# Patient Record
Sex: MALE | Race: WHITE | NOT HISPANIC OR LATINO | Employment: FULL TIME | URBAN - METROPOLITAN AREA
[De-identification: names, ages, dates, MRNs, and addresses within clinical notes are randomized per-mention and may not be internally consistent; named-entity substitution may affect disease eponyms.]

---

## 2023-05-13 DIAGNOSIS — I10 PRIMARY HYPERTENSION: Primary | ICD-10-CM

## 2023-05-15 RX ORDER — AMLODIPINE AND BENAZEPRIL HYDROCHLORIDE 5; 10 MG/1; MG/1
CAPSULE ORAL
Qty: 90 CAPSULE | Refills: 3 | Status: SHIPPED | OUTPATIENT
Start: 2023-05-15 | End: 2023-08-08 | Stop reason: ALTCHOICE

## 2023-05-15 RX ORDER — AMLODIPINE AND BENAZEPRIL HYDROCHLORIDE 5; 10 MG/1; MG/1
1 CAPSULE ORAL DAILY
COMMUNITY
Start: 2020-11-02 | End: 2023-08-21 | Stop reason: SDUPTHER

## 2023-08-08 ENCOUNTER — OFFICE VISIT (OUTPATIENT)
Dept: PRIMARY CARE | Facility: CLINIC | Age: 63
End: 2023-08-08
Payer: OTHER GOVERNMENT

## 2023-08-08 VITALS
DIASTOLIC BLOOD PRESSURE: 82 MMHG | WEIGHT: 226 LBS | HEART RATE: 83 BPM | OXYGEN SATURATION: 97 % | SYSTOLIC BLOOD PRESSURE: 134 MMHG | BODY MASS INDEX: 32.35 KG/M2 | TEMPERATURE: 97.2 F | HEIGHT: 70 IN

## 2023-08-08 DIAGNOSIS — R23.3 BRUISING, SPONTANEOUS: Primary | ICD-10-CM

## 2023-08-08 DIAGNOSIS — I10 PRIMARY HYPERTENSION: ICD-10-CM

## 2023-08-08 DIAGNOSIS — K76.0 FATTY LIVER: ICD-10-CM

## 2023-08-08 DIAGNOSIS — R31.1 BENIGN ESSENTIAL MICROSCOPIC HEMATURIA: ICD-10-CM

## 2023-08-08 DIAGNOSIS — E66.09 CLASS 1 OBESITY DUE TO EXCESS CALORIES WITH SERIOUS COMORBIDITY AND BODY MASS INDEX (BMI) OF 32.0 TO 32.9 IN ADULT: ICD-10-CM

## 2023-08-08 PROBLEM — R16.0 MASS OF MULTIPLE SITES OF LIVER: Status: ACTIVE | Noted: 2023-08-08

## 2023-08-08 PROBLEM — K22.10 EROSIVE ESOPHAGITIS: Status: ACTIVE | Noted: 2023-08-08

## 2023-08-08 PROBLEM — R93.5 ABNORMAL CT OF THE ABDOMEN: Status: ACTIVE | Noted: 2023-08-08

## 2023-08-08 PROBLEM — M89.8X9 LYTIC LESION OF BONE ON X-RAY: Status: ACTIVE | Noted: 2023-08-08

## 2023-08-08 PROBLEM — L57.0 ACTINIC KERATOSES: Status: ACTIVE | Noted: 2023-08-08

## 2023-08-08 PROBLEM — M89.9 LYTIC LESION OF BONE ON X-RAY: Status: ACTIVE | Noted: 2023-08-08

## 2023-08-08 PROBLEM — E66.9 OBESITY: Status: ACTIVE | Noted: 2023-08-08

## 2023-08-08 PROBLEM — R31.9 HEMATURIA: Status: ACTIVE | Noted: 2023-08-08

## 2023-08-08 PROCEDURE — 99214 OFFICE O/P EST MOD 30 MIN: CPT | Performed by: FAMILY MEDICINE

## 2023-08-08 PROCEDURE — 3079F DIAST BP 80-89 MM HG: CPT | Performed by: FAMILY MEDICINE

## 2023-08-08 PROCEDURE — 3075F SYST BP GE 130 - 139MM HG: CPT | Performed by: FAMILY MEDICINE

## 2023-08-08 PROCEDURE — 3008F BODY MASS INDEX DOCD: CPT | Performed by: FAMILY MEDICINE

## 2023-08-08 NOTE — PATIENT INSTRUCTIONS
It was nice to see you today!  Discussed current concerns and addressed   Reviewed recent labs and diagnostics  Reviewed medications list  Continue to eat a healthy diet, exercise at least 3 times a week or more  Plan and follow up discussed  For any further information related to your condition, copy and paste or go to [email or web address]  Get labs, check PT/INR  Hematology consult

## 2023-08-09 ENCOUNTER — LAB (OUTPATIENT)
Dept: LAB | Facility: LAB | Age: 63
End: 2023-08-09
Payer: OTHER GOVERNMENT

## 2023-08-09 DIAGNOSIS — R31.1 BENIGN ESSENTIAL MICROSCOPIC HEMATURIA: ICD-10-CM

## 2023-08-09 DIAGNOSIS — R23.3 BRUISING, SPONTANEOUS: ICD-10-CM

## 2023-08-09 DIAGNOSIS — I10 PRIMARY HYPERTENSION: ICD-10-CM

## 2023-08-09 LAB
ACTIVATED PARTIAL THROMBOPLASTIN TIME IN PPP BY COAGULATION ASSAY: 27 SEC (ref 27–38)
ALANINE AMINOTRANSFERASE (SGPT) (U/L) IN SER/PLAS: 33 U/L (ref 10–52)
ALBUMIN (G/DL) IN SER/PLAS: 4.2 G/DL (ref 3.4–5)
ALKALINE PHOSPHATASE (U/L) IN SER/PLAS: 64 U/L (ref 33–136)
ANION GAP IN SER/PLAS: 12 MMOL/L (ref 10–20)
APPEARANCE, URINE: CLEAR
ASPARTATE AMINOTRANSFERASE (SGOT) (U/L) IN SER/PLAS: 20 U/L (ref 9–39)
BASOPHILS (10*3/UL) IN BLOOD BY AUTOMATED COUNT: 0.09 X10E9/L (ref 0–0.1)
BASOPHILS/100 LEUKOCYTES IN BLOOD BY AUTOMATED COUNT: 1.3 % (ref 0–2)
BILIRUBIN TOTAL (MG/DL) IN SER/PLAS: 0.5 MG/DL (ref 0–1.2)
BILIRUBIN, URINE: NEGATIVE
BLOOD, URINE: ABNORMAL
CALCIDIOL (25 OH VITAMIN D3) (NG/ML) IN SER/PLAS: 26 NG/ML
CALCIUM (MG/DL) IN SER/PLAS: 9.1 MG/DL (ref 8.6–10.6)
CARBON DIOXIDE, TOTAL (MMOL/L) IN SER/PLAS: 26 MMOL/L (ref 21–32)
CHLORIDE (MMOL/L) IN SER/PLAS: 107 MMOL/L (ref 98–107)
CHOLESTEROL (MG/DL) IN SER/PLAS: 186 MG/DL (ref 0–199)
CHOLESTEROL IN HDL (MG/DL) IN SER/PLAS: 74.8 MG/DL
CHOLESTEROL/HDL RATIO: 2.5
COLOR, URINE: YELLOW
CREATININE (MG/DL) IN SER/PLAS: 1.08 MG/DL (ref 0.5–1.3)
EOSINOPHILS (10*3/UL) IN BLOOD BY AUTOMATED COUNT: 0.33 X10E9/L (ref 0–0.7)
EOSINOPHILS/100 LEUKOCYTES IN BLOOD BY AUTOMATED COUNT: 4.9 % (ref 0–6)
ERYTHROCYTE DISTRIBUTION WIDTH (RATIO) BY AUTOMATED COUNT: 12.3 % (ref 11.5–14.5)
ERYTHROCYTE MEAN CORPUSCULAR HEMOGLOBIN CONCENTRATION (G/DL) BY AUTOMATED: 34.2 G/DL (ref 32–36)
ERYTHROCYTE MEAN CORPUSCULAR VOLUME (FL) BY AUTOMATED COUNT: 94 FL (ref 80–100)
ERYTHROCYTES (10*6/UL) IN BLOOD BY AUTOMATED COUNT: 4.97 X10E12/L (ref 4.5–5.9)
GFR MALE: 77 ML/MIN/1.73M2
GLUCOSE (MG/DL) IN SER/PLAS: 100 MG/DL (ref 74–99)
GLUCOSE, URINE: NEGATIVE MG/DL
HEMATOCRIT (%) IN BLOOD BY AUTOMATED COUNT: 46.5 % (ref 41–52)
HEMOGLOBIN (G/DL) IN BLOOD: 15.9 G/DL (ref 13.5–17.5)
IMMATURE GRANULOCYTES/100 LEUKOCYTES IN BLOOD BY AUTOMATED COUNT: 0.4 % (ref 0–0.9)
INR IN PPP BY COAGULATION ASSAY: 0.9 (ref 0.9–1.1)
KETONES, URINE: NEGATIVE MG/DL
LDL: 103 MG/DL (ref 0–99)
LEUKOCYTE ESTERASE, URINE: NEGATIVE
LEUKOCYTES (10*3/UL) IN BLOOD BY AUTOMATED COUNT: 6.7 X10E9/L (ref 4.4–11.3)
LYMPHOCYTES (10*3/UL) IN BLOOD BY AUTOMATED COUNT: 1.41 X10E9/L (ref 1.2–4.8)
LYMPHOCYTES/100 LEUKOCYTES IN BLOOD BY AUTOMATED COUNT: 21.1 % (ref 13–44)
MONOCYTES (10*3/UL) IN BLOOD BY AUTOMATED COUNT: 0.76 X10E9/L (ref 0.1–1)
MONOCYTES/100 LEUKOCYTES IN BLOOD BY AUTOMATED COUNT: 11.4 % (ref 2–10)
NEUTROPHILS (10*3/UL) IN BLOOD BY AUTOMATED COUNT: 4.06 X10E9/L (ref 1.2–7.7)
NEUTROPHILS/100 LEUKOCYTES IN BLOOD BY AUTOMATED COUNT: 60.9 % (ref 40–80)
NITRITE, URINE: NEGATIVE
NRBC (PER 100 WBCS) BY AUTOMATED COUNT: 0 /100 WBC (ref 0–0)
PH, URINE: 5 (ref 5–8)
PLATELETS (10*3/UL) IN BLOOD AUTOMATED COUNT: 232 X10E9/L (ref 150–450)
POTASSIUM (MMOL/L) IN SER/PLAS: 4.1 MMOL/L (ref 3.5–5.3)
PROSTATE SPECIFIC ANTIGEN,SCREEN: 0.67 NG/ML (ref 0–4)
PROTEIN TOTAL: 6.6 G/DL (ref 6.4–8.2)
PROTEIN, URINE: NEGATIVE MG/DL
PROTHROMBIN TIME (PT) IN PPP BY COAGULATION ASSAY: 10.4 SEC (ref 9.8–12.8)
RBC, URINE: 1 /HPF (ref 0–5)
SODIUM (MMOL/L) IN SER/PLAS: 141 MMOL/L (ref 136–145)
SPECIFIC GRAVITY, URINE: 1.01 (ref 1–1.03)
THYROTROPIN (MIU/L) IN SER/PLAS BY DETECTION LIMIT <= 0.05 MIU/L: 2.93 MIU/L (ref 0.44–3.98)
TRIGLYCERIDE (MG/DL) IN SER/PLAS: 43 MG/DL (ref 0–149)
UREA NITROGEN (MG/DL) IN SER/PLAS: 17 MG/DL (ref 6–23)
UROBILINOGEN, URINE: <2 MG/DL (ref 0–1.9)
VLDL: 9 MG/DL (ref 0–40)
WBC, URINE: <1 /HPF (ref 0–5)

## 2023-08-09 PROCEDURE — 80053 COMPREHEN METABOLIC PANEL: CPT

## 2023-08-09 PROCEDURE — 85610 PROTHROMBIN TIME: CPT

## 2023-08-09 PROCEDURE — 84443 ASSAY THYROID STIM HORMONE: CPT

## 2023-08-09 PROCEDURE — 84153 ASSAY OF PSA TOTAL: CPT

## 2023-08-09 PROCEDURE — 85025 COMPLETE CBC W/AUTO DIFF WBC: CPT

## 2023-08-09 PROCEDURE — 80061 LIPID PANEL: CPT

## 2023-08-09 PROCEDURE — 82306 VITAMIN D 25 HYDROXY: CPT

## 2023-08-09 PROCEDURE — 36415 COLL VENOUS BLD VENIPUNCTURE: CPT

## 2023-08-09 PROCEDURE — 81001 URINALYSIS AUTO W/SCOPE: CPT

## 2023-08-09 PROCEDURE — 85730 THROMBOPLASTIN TIME PARTIAL: CPT

## 2023-08-09 NOTE — PROGRESS NOTES
Subjective   Patient ID: Dustin Rocha is a 62 y.o. male.    Patient comes in today for multiple issues.  He has a history of hypertension, obesity with a BMI of 32.43.  He has a fatty liver and esophagitis.  I am under the impression he may drink alcohol more than he should.  He has been having spontaneous bruising.  It is fairly significant from the pictures I am seeing.  He had labs over a year ago and liver functions were normal.  PSA was normal.  CBC and platelets were normal.  No history of thyroid disorder.  No trauma.  No chest pain or shortness of breath, no fever or chills but he has had malaise and just does not feel well in general.        Review of Systems   Constitutional:  Positive for fatigue. Negative for fever and unexpected weight change.   HENT:  Negative for congestion, ear pain, hearing loss, sore throat and trouble swallowing.    Eyes:  Negative for pain and visual disturbance.   Respiratory:  Negative for cough and shortness of breath.    Cardiovascular:  Negative for chest pain, palpitations and leg swelling.   Gastrointestinal:  Negative for abdominal pain, blood in stool, diarrhea, nausea and vomiting.   Genitourinary:  Negative for dysuria, frequency, hematuria and urgency.   Musculoskeletal:  Negative for joint swelling.   Skin:  Negative for pallor and rash.   Neurological:  Negative for dizziness, syncope, weakness, numbness and headaches.   Hematological:  Bruises/bleeds easily.   Psychiatric/Behavioral:  Negative for confusion, decreased concentration, hallucinations and suicidal ideas.      Vitals:    08/08/23 1102   BP: 134/82   Pulse: 83   Temp: 36.2 °C (97.2 °F)   SpO2: 97%      Objective   Physical Exam  Constitutional:       Appearance: Normal appearance. He is obese.   Cardiovascular:      Rate and Rhythm: Normal rate and regular rhythm.      Heart sounds: Normal heart sounds.   Pulmonary:      Effort: Pulmonary effort is normal.      Breath sounds: Normal breath sounds.    Musculoskeletal:      Cervical back: Neck supple.   Skin:     General: Skin is warm and dry.      Comments: There is diffuse ecchymoses and patches.  Reviewed picture which shows the condition earlier on.   Neurological:      General: No focal deficit present.      Mental Status: He is alert.   Psychiatric:         Mood and Affect: Mood normal.         Speech: Speech normal.         Behavior: Behavior normal.         Cognition and Memory: Cognition normal.         Assessment/Plan   Diagnoses and all orders for this visit:  Bruising, spontaneous  -     CBC and Auto Differential; Future  -     Comprehensive Metabolic Panel; Future  -     Lipid Panel; Future  -     Prostate Specific Antigen, Screen; Future  -     Thyroid Stimulating Hormone; Future  -     Urinalysis with Reflex Microscopic; Future  -     Vitamin D, Total; Future  -     APTT; Future  -     Protime-INR; Future  -     Referral to Hematology; Future  Benign essential microscopic hematuria  -     CBC and Auto Differential; Future  -     Comprehensive Metabolic Panel; Future  -     Lipid Panel; Future  -     Prostate Specific Antigen, Screen; Future  -     Thyroid Stimulating Hormone; Future  -     Urinalysis with Reflex Microscopic; Future  -     Vitamin D, Total; Future  -     APTT; Future  -     Protime-INR; Future  -     Referral to Hematology; Future  Primary hypertension  -     CBC and Auto Differential; Future  -     Comprehensive Metabolic Panel; Future  -     Lipid Panel; Future  -     Prostate Specific Antigen, Screen; Future  -     Thyroid Stimulating Hormone; Future  -     Urinalysis with Reflex Microscopic; Future  -     Vitamin D, Total; Future  -     APTT; Future  -     Protime-INR; Future  -     Referral to Hematology; Future        Attachments    [id number]) (1).JPG      Yes

## 2023-08-21 DIAGNOSIS — I10 PRIMARY HYPERTENSION: Primary | ICD-10-CM

## 2023-08-21 RX ORDER — AMLODIPINE AND BENAZEPRIL HYDROCHLORIDE 5; 10 MG/1; MG/1
1 CAPSULE ORAL DAILY
Qty: 90 CAPSULE | Refills: 3 | Status: SHIPPED | OUTPATIENT
Start: 2023-08-21 | End: 2024-08-20

## 2023-08-22 ASSESSMENT — ENCOUNTER SYMPTOMS
SHORTNESS OF BREATH: 0
PALPITATIONS: 0
BRUISES/BLEEDS EASILY: 1
WEAKNESS: 0
UNEXPECTED WEIGHT CHANGE: 0
TROUBLE SWALLOWING: 0
JOINT SWELLING: 0
BLOOD IN STOOL: 0
DIARRHEA: 0
FREQUENCY: 0
CONFUSION: 0
HEADACHES: 0
VOMITING: 0
ABDOMINAL PAIN: 0
DECREASED CONCENTRATION: 0
DIZZINESS: 0
SORE THROAT: 0
COUGH: 0
EYE PAIN: 0
HEMATURIA: 0
NUMBNESS: 0
HALLUCINATIONS: 0
DYSURIA: 0
FEVER: 0
NAUSEA: 0
FATIGUE: 1

## 2023-08-23 DIAGNOSIS — R35.1 NOCTURIA: Primary | ICD-10-CM

## 2023-08-23 RX ORDER — TADALAFIL 5 MG/1
5 TABLET ORAL DAILY
Qty: 10 TABLET | Refills: 0 | Status: SHIPPED | OUTPATIENT
Start: 2023-08-23 | End: 2023-09-22

## 2023-09-12 ENCOUNTER — TELEPHONE (OUTPATIENT)
Dept: PRIMARY CARE | Facility: CLINIC | Age: 63
End: 2023-09-12
Payer: OTHER GOVERNMENT

## 2023-12-20 ENCOUNTER — TELEMEDICINE (OUTPATIENT)
Dept: PRIMARY CARE | Facility: CLINIC | Age: 63
End: 2023-12-20
Payer: OTHER GOVERNMENT

## 2023-12-20 VITALS — WEIGHT: 220 LBS | BODY MASS INDEX: 31.57 KG/M2 | TEMPERATURE: 102.8 F

## 2023-12-20 DIAGNOSIS — B34.9 VIRAL SYNDROME: Primary | ICD-10-CM

## 2023-12-20 PROBLEM — R10.9 ABDOMINAL PAIN: Status: ACTIVE | Noted: 2023-12-20

## 2023-12-20 PROBLEM — K59.00 CONSTIPATION: Status: ACTIVE | Noted: 2023-12-20

## 2023-12-20 PROBLEM — R42 DIZZINESS: Status: ACTIVE | Noted: 2023-12-20

## 2023-12-20 PROBLEM — R23.3 EASY BRUISING: Status: ACTIVE | Noted: 2023-12-20

## 2023-12-20 PROBLEM — F10.10 ALCOHOL ABUSE: Status: ACTIVE | Noted: 2023-12-20

## 2023-12-20 PROBLEM — R42 VERTIGO: Status: ACTIVE | Noted: 2023-12-20

## 2023-12-20 PROBLEM — R07.81 RIB PAIN: Status: ACTIVE | Noted: 2023-12-20

## 2023-12-20 PROCEDURE — 99213 OFFICE O/P EST LOW 20 MIN: CPT | Performed by: FAMILY MEDICINE

## 2023-12-20 RX ORDER — ALBUTEROL SULFATE 90 UG/1
2 AEROSOL, METERED RESPIRATORY (INHALATION) EVERY 4 HOURS PRN
Qty: 8 G | Refills: 5 | Status: SHIPPED | OUTPATIENT
Start: 2023-12-20 | End: 2024-12-19

## 2023-12-20 RX ORDER — BENZONATATE 200 MG/1
200 CAPSULE ORAL 3 TIMES DAILY PRN
Qty: 42 CAPSULE | Refills: 0 | Status: SHIPPED | OUTPATIENT
Start: 2023-12-20 | End: 2024-01-19

## 2023-12-20 ASSESSMENT — ENCOUNTER SYMPTOMS
FEVER: 1
SORE THROAT: 1
COUGH: 1
CHILLS: 0
HEMOPTYSIS: 0
WEIGHT LOSS: 0
RHINORRHEA: 1
HEADACHES: 1
HEARTBURN: 0
WHEEZING: 1
SHORTNESS OF BREATH: 1
MYALGIAS: 0
SWEATS: 1

## 2023-12-20 ASSESSMENT — PAIN SCALES - GENERAL: PAINLEVEL: 7

## 2023-12-20 NOTE — PROGRESS NOTES
Answers submitted by the patient for this visit:  Cough Questionnaire (Submitted on 12/20/2023)  Chief Complaint: Cough  chest pain: No  chills: No  ear congestion: No  ear pain: No  fever: Yes  headaches: Yes  heartburn: No  hemoptysis: No  myalgias: No  nasal congestion: Yes  postnasal drip: Yes  rash: No  rhinorrhea: Yes  shortness of breath: Yes  sore throat: Yes  sweats: Yes  weight loss: No  wheezing: Yes  Aggravated by: nothing

## 2023-12-22 ENCOUNTER — TELEPHONE (OUTPATIENT)
Dept: PRIMARY CARE | Facility: CLINIC | Age: 63
End: 2023-12-22
Payer: OTHER GOVERNMENT

## 2023-12-22 DIAGNOSIS — J40 BRONCHITIS: Primary | ICD-10-CM

## 2023-12-22 RX ORDER — DOXYCYCLINE 100 MG/1
100 CAPSULE ORAL 2 TIMES DAILY
Qty: 20 CAPSULE | Refills: 0 | Status: SHIPPED | OUTPATIENT
Start: 2023-12-22 | End: 2024-01-01

## 2023-12-22 ASSESSMENT — ENCOUNTER SYMPTOMS
PALPITATIONS: 0
NUMBNESS: 0
FATIGUE: 0
HEMOPTYSIS: 0
MYALGIAS: 0
FREQUENCY: 0
WEAKNESS: 0
SORE THROAT: 1
ABDOMINAL PAIN: 0
VOMITING: 0
FEVER: 1
HEADACHES: 1
SHORTNESS OF BREATH: 1
HALLUCINATIONS: 0
DECREASED CONCENTRATION: 0
RHINORRHEA: 1
CHILLS: 0
DYSURIA: 0
TROUBLE SWALLOWING: 0
BLOOD IN STOOL: 0
JOINT SWELLING: 0
UNEXPECTED WEIGHT CHANGE: 0
SWEATS: 1
COUGH: 1
CONFUSION: 0
DIZZINESS: 0
WEIGHT LOSS: 0
EYE PAIN: 0
HEMATURIA: 0
DIARRHEA: 0
HEARTBURN: 0
WHEEZING: 1
NAUSEA: 0

## 2023-12-22 ASSESSMENT — COPD QUESTIONNAIRES: COPD: 0

## 2023-12-22 NOTE — TELEPHONE ENCOUNTER
Seen virtual Wednesday, given albuterol inhaler, tessalon pearls. Pt states symptoms are worsening, cough not better, now discolored mucous. Thinks he has bronchitis. Was advised if not feeling better to contact office

## 2023-12-22 NOTE — PATIENT INSTRUCTIONS
You have a bronchitis, it is most likely viral.  I will give you an inhaler and some cough medicine.  Antibiotics tend not to help at this point.  Drink plenty of fluids, rest, NSAIDs or Tylenol if helpful.  Mucinex can be helpful as well and over-the-counter cough suppressants.  Follow-up if symptoms worsen.

## 2023-12-22 NOTE — PROGRESS NOTES
Subjective   Patient ID: Dustin Rocha is a 63 y.o. male.    URI   This is a new problem. The current episode started in the past 7 days. The problem has been gradually worsening. Associated symptoms include coughing, headaches, rhinorrhea, a sore throat and wheezing. Pertinent negatives include no abdominal pain, chest pain, congestion, diarrhea, dysuria, ear pain, nausea, rash or vomiting. He has tried acetaminophen, NSAIDs, increased fluids and sleep for the symptoms. The treatment provided no relief.   Cough  This is a new problem. The current episode started in the past 7 days. The problem has been unchanged. The cough is Non-productive. Associated symptoms include a fever, headaches, nasal congestion, postnasal drip, rhinorrhea, a sore throat, shortness of breath, sweats and wheezing. Pertinent negatives include no chest pain, chills, ear congestion, ear pain, heartburn, hemoptysis, myalgias, rash or weight loss. Nothing aggravates the symptoms. There is no history of COPD or emphysema.       Review of Systems   Constitutional:  Positive for fever. Negative for chills, fatigue, unexpected weight change and weight loss.   HENT:  Positive for postnasal drip, rhinorrhea and sore throat. Negative for congestion, ear pain, hearing loss and trouble swallowing.    Eyes:  Negative for pain and visual disturbance.   Respiratory:  Positive for cough, shortness of breath and wheezing. Negative for hemoptysis.    Cardiovascular:  Negative for chest pain, palpitations and leg swelling.   Gastrointestinal:  Negative for abdominal pain, blood in stool, diarrhea, heartburn, nausea and vomiting.   Genitourinary:  Negative for dysuria, frequency, hematuria and urgency.   Musculoskeletal:  Negative for joint swelling and myalgias.   Skin:  Negative for pallor and rash.   Neurological:  Positive for headaches. Negative for dizziness, syncope, weakness and numbness.   Psychiatric/Behavioral:  Negative for confusion, decreased  concentration, hallucinations and suicidal ideas.      Vitals:    12/20/23 1300   Temp: 39.3 °C (102.8 °F)      Objective   Physical Exam  Constitutional:       General: He is not in acute distress.     Appearance: Normal appearance. He is not toxic-appearing.      Comments: Sounds congested.   Neurological:      Mental Status: He is alert.         Assessment/Plan   Diagnoses and all orders for this visit:  Viral syndrome  -     albuterol (Ventolin HFA) 90 mcg/actuation inhaler; Inhale 2 puffs every 4 hours if needed for wheezing or shortness of breath.  -     benzonatate (Tessalon) 200 mg capsule; Take 1 capsule (200 mg) by mouth 3 times a day as needed for cough. Do not crush or chew.

## 2024-05-29 ENCOUNTER — HOSPITAL ENCOUNTER (OUTPATIENT)
Dept: RADIOLOGY | Facility: CLINIC | Age: 64
Discharge: HOME | End: 2024-05-29
Payer: OTHER GOVERNMENT

## 2024-05-29 DIAGNOSIS — M25.571 PAIN IN RIGHT ANKLE AND JOINTS OF RIGHT FOOT: ICD-10-CM

## 2024-05-29 DIAGNOSIS — G89.29 OTHER CHRONIC PAIN: ICD-10-CM

## 2024-05-29 PROCEDURE — 73721 MRI JNT OF LWR EXTRE W/O DYE: CPT | Mod: RT

## 2024-05-29 PROCEDURE — 73721 MRI JNT OF LWR EXTRE W/O DYE: CPT | Mod: RIGHT SIDE | Performed by: RADIOLOGY

## 2024-12-31 DIAGNOSIS — I10 PRIMARY HYPERTENSION: ICD-10-CM

## 2024-12-31 RX ORDER — AMLODIPINE AND BENAZEPRIL HYDROCHLORIDE 5; 10 MG/1; MG/1
1 CAPSULE ORAL DAILY
Qty: 90 CAPSULE | Refills: 0 | Status: SHIPPED | OUTPATIENT
Start: 2024-12-31

## 2025-02-22 ENCOUNTER — APPOINTMENT (OUTPATIENT)
Dept: RADIOLOGY | Facility: HOSPITAL | Age: 65
End: 2025-02-22
Payer: OTHER GOVERNMENT

## 2025-02-22 ENCOUNTER — OFFICE VISIT (OUTPATIENT)
Dept: URGENT CARE | Age: 65
End: 2025-02-22
Payer: OTHER GOVERNMENT

## 2025-02-22 ENCOUNTER — HOSPITAL ENCOUNTER (EMERGENCY)
Facility: HOSPITAL | Age: 65
Discharge: HOME | End: 2025-02-22
Attending: EMERGENCY MEDICINE
Payer: OTHER GOVERNMENT

## 2025-02-22 VITALS
DIASTOLIC BLOOD PRESSURE: 83 MMHG | BODY MASS INDEX: 30.92 KG/M2 | HEART RATE: 77 BPM | HEIGHT: 70 IN | TEMPERATURE: 98.1 F | OXYGEN SATURATION: 99 % | RESPIRATION RATE: 16 BRPM | WEIGHT: 216 LBS | SYSTOLIC BLOOD PRESSURE: 146 MMHG

## 2025-02-22 VITALS
DIASTOLIC BLOOD PRESSURE: 85 MMHG | RESPIRATION RATE: 17 BRPM | SYSTOLIC BLOOD PRESSURE: 161 MMHG | BODY MASS INDEX: 30.92 KG/M2 | HEIGHT: 70 IN | WEIGHT: 216 LBS | OXYGEN SATURATION: 99 % | HEART RATE: 70 BPM | TEMPERATURE: 98.2 F

## 2025-02-22 DIAGNOSIS — S09.90XA INJURY OF HEAD, INITIAL ENCOUNTER: ICD-10-CM

## 2025-02-22 DIAGNOSIS — S20.219A CONTUSION OF RIB, UNSPECIFIED LATERALITY, INITIAL ENCOUNTER: Primary | ICD-10-CM

## 2025-02-22 DIAGNOSIS — S19.9XXA INJURY OF NECK, INITIAL ENCOUNTER: ICD-10-CM

## 2025-02-22 DIAGNOSIS — S09.90XA MINOR HEAD INJURY, INITIAL ENCOUNTER: ICD-10-CM
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
## 2025-02-22 PROCEDURE — 99284 EMERGENCY DEPT VISIT MOD MDM: CPT | Mod: 25 | Performed by: EMERGENCY MEDICINE

## 2025-02-22 PROCEDURE — 2500000001 HC RX 250 WO HCPCS SELF ADMINISTERED DRUGS (ALT 637 FOR MEDICARE OP): Performed by: EMERGENCY MEDICINE

## 2025-02-22 PROCEDURE — 70450 CT HEAD/BRAIN W/O DYE: CPT

## 2025-02-22 PROCEDURE — 70450 CT HEAD/BRAIN W/O DYE: CPT | Performed by: STUDENT IN AN ORGANIZED HEALTH CARE EDUCATION/TRAINING PROGRAM

## 2025-02-22 PROCEDURE — 72125 CT NECK SPINE W/O DYE: CPT

## 2025-02-22 PROCEDURE — 71250 CT THORAX DX C-: CPT

## 2025-02-22 PROCEDURE — 71250 CT THORAX DX C-: CPT | Performed by: STUDENT IN AN ORGANIZED HEALTH CARE EDUCATION/TRAINING PROGRAM

## 2025-02-22 PROCEDURE — 72125 CT NECK SPINE W/O DYE: CPT | Performed by: STUDENT IN AN ORGANIZED HEALTH CARE EDUCATION/TRAINING PROGRAM

## 2025-02-22 RX ORDER — OXYCODONE HYDROCHLORIDE 5 MG/1
5 TABLET ORAL ONCE
Status: COMPLETED | OUTPATIENT
Start: 2025-02-22 | End: 2025-02-22

## 2025-02-22 RX ORDER — NAPROXEN 250 MG/1
500 TABLET ORAL ONCE
Status: COMPLETED | OUTPATIENT
Start: 2025-02-22 | End: 2025-02-22

## 2025-02-22 RX ORDER — OXYCODONE HYDROCHLORIDE 5 MG/1
5 TABLET ORAL EVERY 8 HOURS PRN
Qty: 6 TABLET | Refills: 0 | Status: SHIPPED | OUTPATIENT
Start: 2025-02-22

## 2025-02-22 RX ADMIN — OXYCODONE HYDROCHLORIDE 5 MG: 5 TABLET ORAL at 09:28

## 2025-02-22 RX ADMIN — NAPROXEN 500 MG: 250 TABLET ORAL at 09:28

## 2025-02-22 ASSESSMENT — COLUMBIA-SUICIDE SEVERITY RATING SCALE - C-SSRS
2. HAVE YOU ACTUALLY HAD ANY THOUGHTS OF KILLING YOURSELF?: NO
6. HAVE YOU EVER DONE ANYTHING, STARTED TO DO ANYTHING, OR PREPARED TO DO ANYTHING TO END YOUR LIFE?: NO
1. IN THE PAST MONTH, HAVE YOU WISHED YOU WERE DEAD OR WISHED YOU COULD GO TO SLEEP AND NOT WAKE UP?: NO

## 2025-02-22 ASSESSMENT — PATIENT HEALTH QUESTIONNAIRE - PHQ9
SUM OF ALL RESPONSES TO PHQ9 QUESTIONS 1 & 2: 0
2. FEELING DOWN, DEPRESSED OR HOPELESS: NOT AT ALL
1. LITTLE INTEREST OR PLEASURE IN DOING THINGS: NOT AT ALL

## 2025-02-22 ASSESSMENT — PAIN SCALES - GENERAL: PAINLEVEL_OUTOF10: 7

## 2025-02-22 ASSESSMENT — PAIN - FUNCTIONAL ASSESSMENT: PAIN_FUNCTIONAL_ASSESSMENT: 0-10

## 2025-02-22 NOTE — PROGRESS NOTES
"Subjective   Patient ID: Dustin Rocha is a 64 y.o. male. They present today with a chief complaint of Fall Injury (PT states he fell on ice yesterday. Landed on upper back. States he hit his head, neck and back. C/O neck pain.).    History of Present Illness  64-year-old male presents urgent care for complaint of fall occurred yesterday on the ice states he slipped and fell backwards landed on his back.  States he hit his neck and he believes the back of his head at all.  Denies any loss consciousness, confusion, focal deficits, anticoagulant use, vision changes, dizziness or lightheadedness, abdominal pain, nausea or vomiting, shortness of breath.  States it hurts his lower ribs when he takes deep breaths and when walking.  Patient has midline tenderness to the C-spine.  No overlying skin changes, crepitus or obvious deformity to posterior head where patient claims he hit his head.  No neurological deficits.  Patient alert and oriented x 3.  Patient referred emergency department for further evaluation with CT scan imaging.  Patient is agreeable and states he is going directly to the emergency department.          Past Medical History  Allergies as of 02/22/2025 - Reviewed 02/22/2025   Allergen Reaction Noted    Darunavir Other 07/28/2017    Sulfa (sulfonamide antibiotics) Rash 08/08/2023       (Not in a hospital admission)       No past medical history on file.    No past surgical history on file.     reports that he has never smoked. His smokeless tobacco use includes chew.    Review of Systems  Review of Systems   All other systems reviewed and are negative.                                 Objective    Vitals:    02/22/25 0814   TempSrc: Oral   Weight: 98 kg (216 lb)   Height: 1.778 m (5' 10\")     No LMP for male patient.    Physical Exam  Vitals reviewed.   Constitutional:       General: He is not in acute distress.     Appearance: Normal appearance. He is not ill-appearing, toxic-appearing or diaphoretic. "   HENT:      Head: Normocephalic and atraumatic.      Comments: No overlying skin changes or tenderness to palpation to the posterior head.     Ears:      Comments: Negative Reed sign.     Nose: Nose normal.      Mouth/Throat:      Mouth: Mucous membranes are moist.      Pharynx: Oropharynx is clear.   Eyes:      Extraocular Movements: Extraocular movements intact.      Pupils: Pupils are equal, round, and reactive to light.      Comments: Negative raccoon sign   Cardiovascular:      Rate and Rhythm: Normal rate and regular rhythm.   Pulmonary:      Effort: Pulmonary effort is normal. No respiratory distress.      Breath sounds: Normal breath sounds. No stridor. No wheezing, rhonchi or rales.   Abdominal:      General: Abdomen is flat.      Palpations: Abdomen is soft.      Tenderness: There is no abdominal tenderness. There is no right CVA tenderness or left CVA tenderness.   Musculoskeletal:      Cervical back: Normal range of motion and neck supple. No rigidity or tenderness.      Comments: Tenderness to the midline C-spine.  No overlying skin changes or obvious deformity.  Has full range of motion of neck.  No midline tenderness to the T-spine or L-spine.  Patient is ambulatory.   Lymphadenopathy:      Cervical: No cervical adenopathy.   Skin:     General: Skin is warm and dry.   Neurological:      General: No focal deficit present.      Mental Status: He is alert and oriented to person, place, and time.      Sensory: No sensory deficit.      Motor: No weakness.      Coordination: Coordination normal.      Gait: Gait normal.   Psychiatric:         Mood and Affect: Mood normal.         Behavior: Behavior normal.         Procedures    Point of Care Test & Imaging Results from this visit  No results found for this visit on 02/22/25.   No results found.    Diagnostic study results (if any) were reviewed by Sada Calderón PA-C.    Assessment/Plan   Allergies, medications, history, and pertinent  labs/EKGs/Imaging reviewed by Sada Calderón PA-C.     Medical Decision Making  64-year-old male presents urgent care for complaint of fall occurred yesterday on the ice states he slipped and fell backwards landed on his back.  States he hit his neck and he believes the back of his head at all.  Denies any loss consciousness, confusion, focal deficits, anticoagulant use, vision changes, dizziness or lightheadedness, abdominal pain, nausea or vomiting, shortness of breath.  States it hurts his lower ribs when he takes deep breaths and when walking.  Patient has midline tenderness to the C-spine.  No overlying skin changes, crepitus or obvious deformity to posterior head where patient claims he hit his head.  No neurological deficits.  Patient alert and oriented x 3.  Patient referred emergency department for further evaluation with CT scan imaging.  Patient is agreeable and states he is going directly to the emergency department.    Orders and Diagnoses  There are no diagnoses linked to this encounter.    Medical Admin Record      Patient disposition: ED    Electronically signed by Sada Calderón PA-C  8:16 AM

## 2025-02-22 NOTE — ED PROVIDER NOTES
EMERGENCY DEPARTMENT ENCOUNTER      Pt Name: Dustin Rocha  MRN: [id number]  Birthdate 1960  Date of evaluation: 2/22/2025  ED Provider: Waqar Garcia DO     CHIEF COMPLAINT       Chief Complaint   Patient presents with    Fall     Pt to Er for eval of slip and fall yesterday causing him to fall backwards. Pt states he is now having bilateral rib pain, neck pain and chest pain. Pt states in addition he has some SOB. Pt states all symptoms started today went to  and was told to come to ER. Denies any LOC         HISTORY OF PRESENT ILLNESS   (Location/Symptom, Timing/Onset, Context/Setting, Quality, Duration, Modifying Factors, Severity) Note limiting factors.       HPI    Dustin Rocha is a 64 y.o. who presents to the emergency department patient presents for evaluation of bilateral anterior rib pain following mechanical slip and fall yesterday along with left neck pain states history of CAD physical short of breath breathing is breathing reproduces pain.  Event occurred yesterday.  Seen in urgent care and directed to emergency department.  Pain rated 8/10 aching throbbing.  Predominantly twisting movements of thorax    Nursing Notes were reviewed.    History obtained from :  patient    Outside records reviewed: N/A      History/Collateral information obtained from: N/A         Patient's PDMP reviewed personally    ED Course as of 02/22/25 1751   Sat Feb 22, 2025   1141 Jamal results with patient, will discharge follows outpatient [SL]      ED Course User Index  [SL] Waqar Garcia DO         Diagnoses as of 02/22/25 1751   Contusion of rib, unspecified laterality, initial encounter   Minor head injury, initial encounter        Discussion/ MDM:           Differential diagnosis includes but not limited to: Rib fracture, rib contusion musculoskeletal strain    All results/imaging if obtained reviewed and interpreted, results trended/compared with previous levels if available CT imaging of head neck  chest negative for acute process, symptomatically improved emergency department occasions provided  Considered obtaining CBC to evaluate for abnormalities or elevation of inflammatory markers, also CMP to evaluate for any contributing electrolyte or hydration component however patient afebrile vital signs within baseline normal limits, unlikely to contribute to plan for care currently and do not feel necessary at this time  Discussed all results obtained from imaging or labs with patient and those present with patient´s permission, provided opportunity to ask any further questions, all questions answered to the best of my ability, feels comfortable with discharge and plan to follow up with PCP as outpatient. Will return at any time if symptoms worsen, new symptoms develop, or any new concerns arise. Chart created with voice recognition software, errors may be present due to software´s interpretation        REVIEW OF SYSTEMS     Review of Systems  Pertinent positives and negatives as per HPI    PAST MEDICAL HISTORY   No past medical history on file.    SURGICAL HISTORY     No past surgical history on file.    CURRENT MEDICATIONS       Discharge Medication List as of 2/22/2025 11:44 AM        CONTINUE these medications which have NOT CHANGED    Details   albuterol (Ventolin HFA) 90 mcg/actuation inhaler Inhale 2 puffs every 4 hours if needed for wheezing or shortness of breath., Starting Wed 12/20/2023, Until Thu 12/19/2024 at 2359, Normal      amLODIPine-benazepriL (Lotrel) 5-10 mg capsule TAKE ONE CAPSULE BY MOUTH EVERY DAY, Starting Tue 12/31/2024, Normal      tadalafil (Cialis) 5 mg tablet Take 1 tablet (5 mg) by mouth once daily., Starting Wed 8/23/2023, Until Fri 9/22/2023, Normal             ALLERGIES     Darunavir and Sulfa (sulfonamide antibiotics)    FAMILY HISTORY     No family history on file.     SOCIAL HISTORY       Social History     Socioeconomic History    Marital status:    Tobacco Use     Smoking status: Never    Smokeless tobacco: Current     Types: Chew       PHYSICAL EXAM       ED Triage Vitals   Temperature Heart Rate Respirations BP   02/22/25 0914 02/22/25 0914 02/22/25 0914 02/22/25 0914   36.8 °C (98.2 °F) 70 17 161/85      Pulse Ox Temp src Heart Rate Source Patient Position   02/22/25 0914 -- -- --   99 %         BP Location FiO2 (%)     -- --               Physical Exam  PHYSICIAL EXAM: Vitals reviewed  GENERAL:Awake and alert in room, nontoxic-appearing The patient appears nourished and normally developed. Vital signs as documented.     EYES: Head exam is unremarkable. No scleral icterus  Mild cervical paraspinal muscle tenderness to palpation no midline cervical tenderness palpation  HEENT: Mucous membranes moist. Nares patent without copious rhinorrhea.     LUNGS: Lungs are clear to auscultation, -r/r/w without any respiratory distress.  Tender palpation bilateral anterior chest wall area no palpable crepitus no ecchymosis no palpable deformity     CARDIAC: Rhythm is regular. No dysrhythmias or murmurs.     ABDOMEN: Soft, non-tender, non-distended, no rebound/guarding, with no obvious masses     EXTREMITIES: No peripheral edema, with no obvious deformities.     SKIN: Good color, with no significant rashes. No pallor.     NEURO: No obvious neurological deficits, normal sensation and strength bilaterally. Patientable to ambulate with steady gait under own strength.     PSYCH: Mood and affect normal. Appropriate for age.  DIAGNOSTIC RESULTS     RADIOLOGY (Per Emergency Physician):     Interpretation per the Radiologist below, if available at the time of this note:  CT chest wo IV contrast   Final Result   Chronic appearing nondisplaced fractures of the anterior left 5th and   6th ribs. Please correlate with point tenderness. Otherwise, no   evidence of acute traumatic injury in the chest.        Chronic appearing minimally depressed compression fractures of T2 and   T8.         Redemonstration of numerous hypodense hepatic masses which appear   increased in size and number compared to CT 04/15/2021. These have   been previously characterized as multifocal fatty infiltration on   previous MRI such as in 22. Given the increased size and number of   these lesions since the prior study would recommend excluding any   possibility of a superimposed metastatic process with a repeat MRI of   the liver on a nonemergent basis. YELLOW ALERT: An incidental finding   alert was sent per protocol.        MACRO:   Critical Finding:  See findings. Notification was initiated on   2/22/2025 at 11:10 am by  Tino Madrid.  (**-YCF-**) Instructions:        Signed by: Tino Madrid 2/22/2025 11:10 AM   Dictation workstation:   [id number]      CT head wo IV contrast   Final Result   CT HEAD:   1. No acute intracranial abnormality or calvarial fracture.                  CT CERVICAL SPINE:   1. No acute fracture or traumatic malalignment of the cervical spine.   2. Spondylotic changes of the cervical spine as detailed above.        MACRO:   None.        Signed by: Tino Madrid 2/22/2025 11:00 AM   Dictation workstation:   [id number]      CT cervical spine wo IV contrast   Final Result   CT HEAD:   1. No acute intracranial abnormality or calvarial fracture.                  CT CERVICAL SPINE:   1. No acute fracture or traumatic malalignment of the cervical spine.   2. Spondylotic changes of the cervical spine as detailed above.        MACRO:   None.        Signed by: Tino Madrid 2/22/2025 11:00 AM   Dictation workstation:   [id number]            LABS:  Labs Reviewed - No data to display    All other labs were within normal range or not returned as of this dictation.    EMERGENCY DEPARTMENT COURSE :   Vitals:    Vitals:    02/22/25 0912 02/22/25 0914 02/22/25 0914   BP:  161/85    Pulse:  70    Resp:  17    Temp:   36.8 °C (98.2 °F)   SpO2:  99%    Weight: 98 kg (216 lb)     Height: 1.778 m  "(5' 10\")         Medications   oxyCODONE (Roxicodone) immediate release tablet 5 mg (5 mg oral Given 2/22/25 0928)   naproxen (Naprosyn) tablet 500 mg (500 mg oral Given 2/22/25 0928)             PROCEDURES:  Unless otherwise noted below, none     Procedures    CRITICAL CARE TIME       FINAL IMPRESSION      1. Contusion of rib, unspecified laterality, initial encounter    2. Minor head injury, initial encounter          DISPOSITION    Discharge 02/22/2025 11:42:25 AM    PATIENT REFERRED TO:  Nazanin Kim MD  [address]  [facility] [address]  [phone]            DISCHARGE MEDICATIONS:  Discharge Medication List as of 2/22/2025 11:44 AM        START taking these medications    Details   oxyCODONE (Roxicodone) 5 mg immediate release tablet Take 1 tablet (5 mg) by mouth every 8 hours if needed for severe pain (7 - 10) for up to 7 doses., Starting Sat 2/22/2025, Print                (Comment: Please note this report has been produced using speech recognition software and may contain errors related to that system including errors in grammar, punctuation, and spelling, as well as words and phrases that may be inappropriate.  If there are any questions or concerns please feel free to contact the dictating provider for clarification.)    Waqar Garcia DO (electronically signed)  Emergency Medicine Provider      Waqar Garcia DO  02/22/25 1751    "

## 2025-02-22 NOTE — DISCHARGE INSTRUCTIONS
Thank you for choosing [facility] and allowing me to take care of you today.     If your symptoms are not improving, begin to worsen, new symptoms develop/additional concerns arise, please return to the Emergency Department at any time for further evaluation and  treatment.     Dr. Waqar Garcia Jr., D.O.     Follow-up with your primary care doctor or any emergency department in the next 2-3 days for re-evaluation and further treatment as deemed necessary       Use 1000 mg acetaminophen with 400 mg ibuprofen every 6 hours as needed for pain for the next 5 days    Discharge

## 2025-02-25 ENCOUNTER — TELEPHONE (OUTPATIENT)
Dept: PRIMARY CARE | Facility: CLINIC | Age: 65
End: 2025-02-25
Payer: OTHER GOVERNMENT

## 2025-02-25 NOTE — TELEPHONE ENCOUNTER
Pt has physical injuries, ribs/back, wants to know if he is able to be seen virtually as it is hard for him to come in right now.

## 2025-03-03 ENCOUNTER — APPOINTMENT (OUTPATIENT)
Dept: PRIMARY CARE | Facility: CLINIC | Age: 65
End: 2025-03-03
Payer: OTHER GOVERNMENT

## 2025-03-03 DIAGNOSIS — R16.0 LIVER MASSES: Primary | ICD-10-CM

## 2025-03-03 PROCEDURE — 99213 OFFICE O/P EST LOW 20 MIN: CPT | Performed by: FAMILY MEDICINE

## 2025-03-03 ASSESSMENT — ENCOUNTER SYMPTOMS
FEVER: 0
HEMATURIA: 0
TROUBLE SWALLOWING: 0
EYE PAIN: 0
SORE THROAT: 0
FATIGUE: 0
FREQUENCY: 0
DIZZINESS: 0
DIARRHEA: 0
JOINT SWELLING: 0
HEADACHES: 0
SHORTNESS OF BREATH: 0
ABDOMINAL PAIN: 0
HALLUCINATIONS: 0
UNEXPECTED WEIGHT CHANGE: 0
BLOOD IN STOOL: 0
VOMITING: 0
NAUSEA: 0
NUMBNESS: 0
DECREASED CONCENTRATION: 0
DYSURIA: 0
WEAKNESS: 0
PALPITATIONS: 0
COUGH: 0
CONFUSION: 0

## 2025-03-03 NOTE — PROGRESS NOTES
Subjective   Patient ID: Dustin Rocha is a 64 y.o. male.    Patient went to the emergency room after a fall on February 22.  He had CT of the head, neck and chest.  They found old rib fractures but nothing that looked acute.  They also noted that liver lesions that appear to be fatty but had grown from the last CAT scan a few years ago.  They recommended MRI. He had 2 stable MRI, last in 2022.  They recommended surveillance.  He had another 1 done in May 2022 which was unchanged.  On the CT scan they said that the lesions are larger and rec another MRI.    He is feeling much better and ready to return to work.  He has good range of motion of the upper extremities and the pain is livable.  No shortness of breath.        Review of Systems   Constitutional:  Negative for fatigue, fever and unexpected weight change.   HENT:  Negative for congestion, ear pain, hearing loss, sore throat and trouble swallowing.    Eyes:  Negative for pain and visual disturbance.   Respiratory:  Negative for cough and shortness of breath.    Cardiovascular:  Positive for chest pain. Negative for palpitations and leg swelling.        In Ribs   Gastrointestinal:  Negative for abdominal pain, blood in stool, diarrhea, nausea and vomiting.   Genitourinary:  Negative for dysuria, frequency, hematuria and urgency.   Musculoskeletal:  Negative for joint swelling.   Skin:  Negative for pallor and rash.   Neurological:  Negative for dizziness, syncope, weakness, numbness and headaches.   Psychiatric/Behavioral:  Negative for confusion, decreased concentration, hallucinations and suicidal ideas.      There were no vitals filed for this visit.   There is no height or weight on file to calculate BMI.  Objective   Physical Exam  Constitutional:       General: He is not in acute distress.     Appearance: He is obese. He is not toxic-appearing.   Neurological:      General: No focal deficit present.      Mental Status: He is alert and oriented to  "person, place, and time.   Psychiatric:         Mood and Affect: Mood normal.         Behavior: Behavior normal.         Thought Content: Thought content normal.         Judgment: Judgment normal.         Last Labs:     CMP:   Lab Results   Component Value Date    CALCIUM 9.1 08/09/2023    CALCIUM 10.0 05/25/2022    PROT 6.6 08/09/2023    PROT 7.6 05/25/2022    ALBUMIN 4.2 08/09/2023    ALBUMIN 4.8 05/25/2022    ALBUMIN 4.2 03/01/2022    ALBUMIN 4.5 10/27/2020    AST 20 08/09/2023    AST 25 05/25/2022    ALKPHOS 64 08/09/2023    ALKPHOS 84 05/25/2022    BILITOT 0.5 08/09/2023    BILITOT 0.4 05/25/2022     CBC:   Lab Results   Component Value Date    WBC 6.7 08/09/2023    WBC 10.7 05/25/2022    HGB 15.9 08/09/2023    HGB 16.6 (H) 05/25/2022    HCT 46.5 08/09/2023    HCT 48.3 05/25/2022    MCV 94 08/09/2023    MCV 94.5 05/25/2022     08/09/2023     05/25/2022     A1C:   Lab Results   Component Value Date    HGBA1C 5.5 03/01/2022    HGBA1C 5.6 11/02/2020     LIPID PANEL:   Lab Results   Component Value Date    CHOL 186 08/09/2023    CHOL 187 03/01/2022    TRIG 43 08/09/2023    TRIG 58 03/01/2022    HDL 74.8 08/09/2023    HDL 75.5 03/01/2022    CHHDL 2.5 08/09/2023    CHHDL 2.5 03/01/2022    LDLF 103 (H) 08/09/2023    LDLF 100 (H) 03/01/2022    VLDL 9 08/09/2023    VLDL 12 03/01/2022     TSH:   Lab Results   Component Value Date    TSH 2.93 08/09/2023    TSH 1.87 11/02/2020     PSA:   No results found for: \"PSA\"     Assessment/Plan   There are no diagnoses linked to this encounter.    "

## 2025-03-03 NOTE — PATIENT INSTRUCTIONS
Return to work note given.  Notified that he needs to get a follow-up MRI and he agrees with plan.

## 2025-03-03 NOTE — LETTER
Date: March 3, 2025  RE:  Dustin Rocha  :  1960      To Whom It May Concern:    Our patient, Dustin, has been under our care and now may return back to work without restrictions. He was evaluated on 3/3/2025    Their return to work date is:  3/4/2025.    If you have questions concerning this patient's immediate care, please feel free to contact our office at [phone].    Sincerely,      Nazanin Kim MD  Board Certified Family Medicine

## 2025-03-03 NOTE — LETTER
Date: March 3, 2025  RE:  Dustin Rocha  :  1960      To Whom It May Concern:    Our patient, Dustin, has been under our care and now may return back to work without restrictions. He was evaluated 3/3/2024.    Their return to work date is:  3/4/2025    If you have questions concerning this patient's immediate care, please feel free to contact our office at [phone]    Sincerely,      Nazanin Kim MD

## 2025-04-22 DIAGNOSIS — I10 PRIMARY HYPERTENSION: ICD-10-CM

## 2025-04-22 NOTE — TELEPHONE ENCOUNTER
Calling for refill of amlodipine. Last script only for 90 days. Didn't know if he needed a follow up or if we could put refills on.

## 2025-04-23 RX ORDER — AMLODIPINE AND BENAZEPRIL HYDROCHLORIDE 5; 10 MG/1; MG/1
1 CAPSULE ORAL DAILY
Qty: 90 CAPSULE | Refills: 3 | Status: SHIPPED | OUTPATIENT
Start: 2025-04-23